# Patient Record
Sex: MALE | URBAN - METROPOLITAN AREA
[De-identification: names, ages, dates, MRNs, and addresses within clinical notes are randomized per-mention and may not be internally consistent; named-entity substitution may affect disease eponyms.]

---

## 2022-08-01 ENCOUNTER — OFFICE VISIT (OUTPATIENT)
Dept: URBAN - METROPOLITAN AREA CLINIC 22 | Facility: CLINIC | Age: 57
End: 2022-08-01
Payer: COMMERCIAL

## 2022-08-01 DIAGNOSIS — S05.32XA: Primary | ICD-10-CM

## 2022-08-01 PROCEDURE — 99205 OFFICE O/P NEW HI 60 MIN: CPT | Performed by: STUDENT IN AN ORGANIZED HEALTH CARE EDUCATION/TRAINING PROGRAM

## 2022-08-01 NOTE — IMPRESSION/PLAN
Impression: Ocular laceration w/o loss of intraocular tissue of left eye, initial encounter: S05.32xA. Plan: Discussed findings. Full thickness laceration, (+)Kermit sign. Discussed case w/ Soheila Puls. Patient to go to [facility] ED immediately. Instructed patient no food/water intake.

## 2022-08-10 ENCOUNTER — OFFICE VISIT (OUTPATIENT)
Dept: URBAN - METROPOLITAN AREA CLINIC 10 | Facility: CLINIC | Age: 57
End: 2022-08-10
Payer: COMMERCIAL

## 2022-08-10 DIAGNOSIS — H17.12 CENTRAL CORNEAL OPACITY, LEFT EYE: ICD-10-CM

## 2022-08-10 DIAGNOSIS — H25.12 NUCLEAR SCLEROSIS CATARACT OF LEFT EYE: ICD-10-CM

## 2022-08-10 DIAGNOSIS — S05.32XA: Primary | ICD-10-CM

## 2022-08-10 PROCEDURE — 76514 ECHO EXAM OF EYE THICKNESS: CPT | Performed by: OPHTHALMOLOGY

## 2022-08-10 PROCEDURE — 92004 COMPRE OPH EXAM NEW PT 1/>: CPT | Performed by: OPHTHALMOLOGY

## 2022-08-10 ASSESSMENT — INTRAOCULAR PRESSURE
OS: 19
OD: 16

## 2022-08-10 NOTE — IMPRESSION/PLAN
Impression: Central corneal opacity, left eye: H17.12. Plan: Will limit vision. After suture removal, will refer to optom for trial of specialty CTLs. If unsuccessful, may need PKP.

## 2022-08-10 NOTE — IMPRESSION/PLAN
Impression: Ocular laceration w/o loss of intraocular tissue of left eye, initial encounter: S05.32XA. Plan: Corneal laceration 2/2 nail ricochet to eye s/p repair by Dr. Elle Soto 1 week ago. BCL removed, salome neg. Will plan for gradual removal of sutures; allow for mow healing time for now. Decrease pred QID x 1 week then decrease to TID. Decrease oflox to QID x 1 week then d/c.
d/c atropine. D/c Amoxicilin If retina B-scan has not been done, I recommend.

## 2022-08-10 NOTE — IMPRESSION/PLAN
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 Plan: Early traumatic cataract, no penetration of anterior capsule not. CTM, no intervention necessary.

## 2022-08-29 ENCOUNTER — OFFICE VISIT (OUTPATIENT)
Dept: URBAN - METROPOLITAN AREA CLINIC 10 | Facility: CLINIC | Age: 57
End: 2022-08-29
Payer: COMMERCIAL

## 2022-08-29 PROCEDURE — 76514 ECHO EXAM OF EYE THICKNESS: CPT | Performed by: OPHTHALMOLOGY

## 2022-08-29 PROCEDURE — 92025 CPTRIZED CORNEAL TOPOGRAPHY: CPT | Performed by: OPHTHALMOLOGY

## 2022-08-29 PROCEDURE — 92012 INTRM OPH EXAM EST PATIENT: CPT | Performed by: OPHTHALMOLOGY

## 2022-08-29 ASSESSMENT — INTRAOCULAR PRESSURE
OS: 10
OD: 6

## 2022-08-29 NOTE — IMPRESSION/PLAN
Impression: Ocular lac w/o prolaps/loss of intraoc tissue, l eye, subs: S05.32XD. Corneal laceration 2/2 nail ricochet to eye s/p repair by Dr. Wilfredo Santana 8/2/22. Plan: Healing. 2 sutures removed, salome neg. oflox placed. Use oflox TID x 3 days Decrease pred TID x 1 week then decrease to BID. Cont lubrication

## 2022-09-02 ENCOUNTER — OFFICE VISIT (OUTPATIENT)
Dept: URBAN - METROPOLITAN AREA CLINIC 10 | Facility: CLINIC | Age: 57
End: 2022-09-02
Payer: COMMERCIAL

## 2022-09-02 DIAGNOSIS — S05.32XD: Primary | ICD-10-CM

## 2022-09-02 DIAGNOSIS — H47.012 ISCHEMIC OPTIC NEUROPATHY, LEFT EYE: ICD-10-CM

## 2022-09-02 DIAGNOSIS — H25.12 NUCLEAR SCLEROSIS CATARACT OF LEFT EYE: ICD-10-CM

## 2022-09-02 DIAGNOSIS — H17.12 CENTRAL CORNEAL OPACITY, LEFT EYE: ICD-10-CM

## 2022-09-02 PROCEDURE — 92014 COMPRE OPH EXAM EST PT 1/>: CPT | Performed by: OPHTHALMOLOGY

## 2022-09-02 PROCEDURE — 92134 CPTRZ OPH DX IMG PST SGM RTA: CPT | Performed by: OPHTHALMOLOGY

## 2022-09-02 PROCEDURE — 76512 OPH US DX B-SCAN: CPT | Performed by: OPHTHALMOLOGY

## 2022-09-02 ASSESSMENT — INTRAOCULAR PRESSURE
OS: 19
OD: 17

## 2022-09-02 NOTE — IMPRESSION/PLAN
Impression: Ocular lac w/o prolaps/loss of intraoc tissue LEFT eye -- s/p repair by Dr. Samantha Lara 8/2/22. Plan: Healing. SEVERE open globe trauma repaired w Dr. Samantha Lara 8/2/22 while hammering metal on metal - lacerated globe. CLOSED. Get records from Dr. Samantha Lara - get CT Scan to rule out any metallic fragment.

## 2022-09-02 NOTE — IMPRESSION/PLAN
Impression:  optic neuropathy, left  Trauma Plan: TRAUMA left eye -- cannot rule out commotio or retinal Hmg w B-scan. 1+ RAPD may be OPTIC NEUROPATHY Trauma OS? D/w'd pt and family that [place] will not be 20/20 even if all other issues recover. B-SCAN shows NO RD --  Retina on ultrasound imaging is attached and flat. No RD or defect. No mass or tumor visualized. With B-scan resolution, cannot comment on microarchitecture or function.

## 2022-09-02 NOTE — IMPRESSION/PLAN
Impression: Central corneal opacity, left Plan: AGREE - this will limit vision. After suture removal, will refer to optom for trial of specialty CTLs or PKP -- Defer to Dr. Vivian Smith.

## 2022-09-02 NOTE — IMPRESSION/PLAN
Impression: Nuclear sclerosis cataract of left Plan: Early traumatic cataract, no penetration of anterior capsule  -- it appears on exam.  Agree w Dr. Smooth Santillan. However, cannot find e/o CT scan to r/o retained micro-IOFB -- GET CT Scan non-contrast fine cuts orbit. Cataract surgery future Dr. Smooth Santillan.

## 2022-09-20 ENCOUNTER — OFFICE VISIT (OUTPATIENT)
Dept: URBAN - METROPOLITAN AREA CLINIC 10 | Facility: CLINIC | Age: 57
End: 2022-09-20
Payer: COMMERCIAL

## 2022-09-20 DIAGNOSIS — H25.12 NUCLEAR SCLEROSIS CATARACT OF LEFT EYE: ICD-10-CM

## 2022-09-20 DIAGNOSIS — H47.012 ISCHEMIC OPTIC NEUROPATHY, LEFT EYE: ICD-10-CM

## 2022-09-20 DIAGNOSIS — S05.32XD: Primary | ICD-10-CM

## 2022-09-20 DIAGNOSIS — H17.12 CENTRAL CORNEAL OPACITY, LEFT EYE: ICD-10-CM

## 2022-09-20 PROCEDURE — 92012 INTRM OPH EXAM EST PATIENT: CPT | Performed by: OPHTHALMOLOGY

## 2022-09-20 RX ORDER — PREDNISOLONE ACETATE 10 MG/ML
1 % SUSPENSION/ DROPS OPHTHALMIC
Qty: 5 | Refills: 1 | Status: ACTIVE
Start: 2022-09-20

## 2022-09-20 NOTE — IMPRESSION/PLAN
Impression: Central corneal opacity, left eye: H17.12. Plan: Will limit vision.   
Will discuss PKP nv

## 2022-09-20 NOTE — IMPRESSION/PLAN
Impression: optic neuropathy, left  Trauma Plan: Per Dr. Mike Marte:
TRAUMA left eye -- cannot rule out commotio or retinal Hmg w B-scan. 1+ RAPD may be OPTIC NEUROPATHY Trauma OS? D/w'd pt and family that [address] will not be 20/20 even if all other issues recover. B-SCAN shows NO RD --  Retina on ultrasound imaging is attached and flat. No RD or defect. No mass or tumor visualized. With B-scan resolution, cannot comment on microarchitecture or function. 
**CT scan pending 10/14

## 2022-09-20 NOTE — IMPRESSION/PLAN
Impression: Ocular lac w/o prolaps/loss of intraoc tissue, l eye, subs: S05.32XD. Corneal laceration 2/2 nail ricochet to eye s/p repair by Dr. Kilo Mandel 8/2/22. Plan: Healing. All remaining sutures removed, salome neg. oflox placed. Use oflox TID x 3 days Cont pred BID x 2 week then decrease to qD x 2 weeks then d/c. Cont lubrication Explained to pt that he will likely need PKP. Will discuss nv.   Info given

## 2022-11-01 ENCOUNTER — OFFICE VISIT (OUTPATIENT)
Dept: URBAN - METROPOLITAN AREA CLINIC 10 | Facility: CLINIC | Age: 57
End: 2022-11-01
Payer: COMMERCIAL

## 2022-11-01 DIAGNOSIS — S05.32XD: Primary | ICD-10-CM

## 2022-11-01 DIAGNOSIS — H25.12 NUCLEAR SCLEROSIS CATARACT OF LEFT EYE: ICD-10-CM

## 2022-11-01 DIAGNOSIS — H17.12 CENTRAL CORNEAL OPACITY, LEFT EYE: ICD-10-CM

## 2022-11-01 DIAGNOSIS — H47.012 ISCHEMIC OPTIC NEUROPATHY, LEFT EYE: ICD-10-CM

## 2022-11-01 PROCEDURE — 92025 CPTRIZED CORNEAL TOPOGRAPHY: CPT | Performed by: OPHTHALMOLOGY

## 2022-11-01 PROCEDURE — 92014 COMPRE OPH EXAM EST PT 1/>: CPT | Performed by: OPHTHALMOLOGY

## 2022-11-01 ASSESSMENT — INTRAOCULAR PRESSURE
OS: 11
OD: 10

## 2022-11-01 ASSESSMENT — VISUAL ACUITY: OS: 20/40

## 2022-11-01 NOTE — IMPRESSION/PLAN
Impression: Ocular lac w/o prolaps/loss of intraoc tissue, l eye, subs: S05.32XD. Corneal laceration 2/2 nail ricochet to eye s/p repair by Dr. Ward Olivas 8/2/22. Plan: Healed.   All sutures out

## 2022-11-01 NOTE — IMPRESSION/PLAN
Impression: Central corneal opacity, left eye: H17.12. Plan: Will limit vision.   
Refracts to 20/40, encouraged pt to trial glasses or specialty CTLs first.
If unable to tolerate or vision unacceptable, may return for consideration of PKP

## 2022-11-01 NOTE — IMPRESSION/PLAN
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 Plan: Early traumatic cataract, no penetration of anterior capsule noted. CTM, no intervention necessary.

## 2022-11-01 NOTE — IMPRESSION/PLAN
Impression: optic neuropathy, left  Trauma Plan: Per Dr. Mandi Weaver:
TRAUMA left eye -- cannot rule out commotio or retinal Hmg w B-scan. 1+ RAPD may be OPTIC NEUROPATHY Trauma OS? D/w'd pt and family that [address] will not be 20/20 even if all other issues recover. B-SCAN shows NO RD --  Retina on ultrasound imaging is attached and flat. No RD or defect. No mass or tumor visualized. With B-scan resolution, cannot comment on microarchitecture or function. 
**CT scan WNL